# Patient Record
Sex: FEMALE | Race: WHITE
[De-identification: names, ages, dates, MRNs, and addresses within clinical notes are randomized per-mention and may not be internally consistent; named-entity substitution may affect disease eponyms.]

---

## 2018-01-25 ENCOUNTER — HOSPITAL ENCOUNTER (OUTPATIENT)
Dept: HOSPITAL 80 - BHFA | Age: 83
End: 2018-01-25
Attending: INTERNAL MEDICINE
Payer: COMMERCIAL

## 2018-01-25 DIAGNOSIS — E78.5: ICD-10-CM

## 2018-01-25 DIAGNOSIS — I27.20: Primary | ICD-10-CM

## 2018-02-02 ENCOUNTER — HOSPITAL ENCOUNTER (OUTPATIENT)
Dept: HOSPITAL 80 - BHFA | Age: 83
End: 2018-02-02
Attending: INTERNAL MEDICINE
Payer: COMMERCIAL

## 2018-02-02 DIAGNOSIS — E78.5: ICD-10-CM

## 2018-02-02 DIAGNOSIS — I27.21: Primary | ICD-10-CM

## 2018-10-19 ENCOUNTER — HOSPITAL ENCOUNTER (OUTPATIENT)
Dept: HOSPITAL 80 - FSGY | Age: 83
Discharge: HOME | End: 2018-10-19
Attending: INTERNAL MEDICINE
Payer: COMMERCIAL

## 2018-10-19 VITALS — DIASTOLIC BLOOD PRESSURE: 90 MMHG | SYSTOLIC BLOOD PRESSURE: 149 MMHG

## 2018-10-19 DIAGNOSIS — Z86.010: ICD-10-CM

## 2018-10-19 DIAGNOSIS — Z96.653: ICD-10-CM

## 2018-10-19 DIAGNOSIS — E03.9: ICD-10-CM

## 2018-10-19 DIAGNOSIS — Z98.0: ICD-10-CM

## 2018-10-19 DIAGNOSIS — Z90.13: ICD-10-CM

## 2018-10-19 DIAGNOSIS — Z85.3: ICD-10-CM

## 2018-10-19 DIAGNOSIS — Z12.11: Primary | ICD-10-CM

## 2018-10-19 DIAGNOSIS — E11.9: ICD-10-CM

## 2018-10-19 DIAGNOSIS — D12.5: ICD-10-CM

## 2018-10-19 DIAGNOSIS — K21.9: ICD-10-CM

## 2018-10-19 DIAGNOSIS — K29.51: ICD-10-CM

## 2018-10-19 DIAGNOSIS — Z80.0: ICD-10-CM

## 2018-10-19 DIAGNOSIS — D12.3: ICD-10-CM

## 2018-10-19 DIAGNOSIS — K62.1: ICD-10-CM

## 2018-10-19 DIAGNOSIS — E78.5: ICD-10-CM

## 2018-10-19 NOTE — GIREPORT
[facility]

Surgical Services - Endoscopy Department

_____________________________________________________________________________________________________
_______

Patient Name: Sandra Comer                           Procedure Date: 10/19/2018 11:56 AM

MRN: [id number]                                       Account Number: [id number]

Patient Type: Outpatient                             Attending  MD/ ER Physician: Jennifer Reed MD

_____________________________________________________________________________________________________
_______

 

Procedure:                    Upper GI endoscopy

Indications:                  Epigastric abdominal pain, Heartburn

Providers:                    Jennifer eRed MD

Medicines:                    Monitored Anesthesia Care

Complications:                No immediate complications.

Description of Procedure:     After obtaining informed consent, the endoscope was passed under direct
 

                              vision. Throughout the procedure, the patient's blood pressure, pulse, 
and 

                              oxygen saturations were monitored continuously. The Endoscope was intro
duced 

                              through the mouth, and advanced to the second part of duodenum. The [facility]
er GI 

                              endoscopy was accomplished without difficulty. The patient tolerated th
e 

                              procedure well.

Findings:                     The esophagus was normal.

                              Localized moderately erythematous mucosa without bleeding was found in 
the 

                              gastric antrum. Biopsies were taken with a cold forceps for histology. 


                              Estimated blood loss was minimal.

                              The examined duodenum was normal. Biopsies for histology were taken wit
h a 

                              cold forceps for evaluation of celiac disease. Estimated blood loss was
 

                              minimal.

Estimated Blood Loss:         Estimated blood loss was minimal.

Post Op Diagnosis:            - Normal esophagus.

                              - Erythematous mucosa in the antrum. Biopsied.

                              - Normal examined duodenum. Biopsied.

Recommendation:               - Await pathology results.

                              - Patient has a contact number available for emergencies. The signs and
 

                              symptoms of potential delayed complications were discussed with the pat
ient. 

                              Return to normal activities tomorrow. Written discharge instructions we
re 

                              provided to the patient.

                              - Resume previous diet.

                              - Continue present medications.

                              - Use Prilosec OTC 20 mg PO daily for 12 weeks.

                              - Return to GI office in 3 months.

                              - Discharge patient to home.

                              - Thank you for allowing me to participate in the care of your patient.


Attending Participation:

     I personally performed the entire procedure.

 

Jennifer Reed MD

____________________

Jennifer Reed MD

10/19/2018 12:29:27 PM

This report has been signed electronicallyJennifer Reed MD

Number of Addenda: 0

 

Note Initiated On: 10/19/2018 11:56 AM

[email or web address]

## 2018-10-19 NOTE — PDGENHP
History & Physical


Chief Complaint: GERD h/o polyps


History of Present Illness: Heartburn  no dysphagia, h/o colon polyps.  Colon 

perf with colon other GI


Relevant Physical Exam: CV rrr s1s2 nl.  Chest CTA.  Abd + BS soft


Cardiorespiratory Assessment: ASA 11

## 2018-10-19 NOTE — GIREPORT
[facility]

Surgical Services - Endoscopy Department

_____________________________________________________________________________________________________
_______

Patient Name: Sandra Comer                           Procedure Date: 10/19/2018 11:57 AM

MRN: [id number]                                       Account Number: [id number]

Patient Type: Outpatient                             Attending  MD/ ER Physician: Jennifer Reed MD

_____________________________________________________________________________________________________
_______

 

Procedure:                    Colonoscopy

Indications:                  High risk colon cancer surveillance: Personal history of colonic polyps


Providers:                    Jennifer Reed MD

Medicines:                    Monitored Anesthesia Care

Complications:                No immediate complications.

Description of Procedure:     After obtaining informed consent, the scope was passed under direct vis
ion. 

                              Throughout the procedure, the patient's blood pressure, pulse, and oxyg
en 

                              saturations were monitored continuously. The Colonoscope was introduced
 

                              through the anus and advanced to the cecum, identified by appendiceal 

                              orifice and ileocecal valve. The colonoscopy was performed without 

                              difficulty. The patient tolerated the procedure well. The quality of 
e 

                              bowel preparation was good except in the cecum. The terminal ileum, 

                              ileocecal valve, appendiceal orifice, and rectum were photographed.

Findings:                     The perianal and digital rectal examinations were normal.

                              There was evidence of a prior end-to-side colo-colonic anastomosis in t
he 

                              sigmoid colon. This was patent and was characterized by healthy appeari
ng 

                              mucosa.

                              Diverticula were found in the sigmoid colon.

                              A 7 mm polyp was found in the hepatic flexure. The polyp was sessile. T
he 

                              polyp was removed with a cold snare. Resection and retrieval were compl
ete. 

                              Estimated blood loss was minimal.

                              A 6 mm polyp was found in the sigmoid colon. The polyp was sessile. The
 

                              polyp was removed with a cold snare. Resection and retrieval were compl
ete. 

                              Estimated blood loss was minimal.

                              A 4 mm polyp was found in the rectum. The polyp was sessile. The polyp 
was 

                              removed with a cold biopsy forceps. Resection and retrieval were comple
te. 

                              Estimated blood loss was minimal.

Estimated Blood Loss:         Estimated blood loss was minimal.

Post Op Diagnosis:            - Patent end-to-side colo-colonic anastomosis, characterized by healthy
 

                              appearing mucosa.

                              - Diverticulosis in the sigmoid colon.

                              - One 7 mm polyp at the hepatic flexure, removed with a cold snare. Res
ected 

                              and retrieved.

                              - One 6 mm polyp in the sigmoid colon, removed with a cold snare. Resec
diana 

                              and retrieved.

                              - One 4 mm polyp in the rectum, removed with a cold biopsy forceps. Res
ected 

                              and retrieved.

Recommendation:               - Await pathology results.

                              - Patient has a contact number available for emergencies. The signs and
 

                              symptoms of potential delayed complications were discussed with the pat
ient. 

                              Return to normal activities tomorrow. Written discharge instructions we
re 

                              provided to the patient.

                              - High fiber diet.

                              - Continue present medications.

                              - Consider repeat colonoscopy is recommended for surveillance. The 

                              colonoscopy date will be determined after pathology results from today'
s 

                              exam become available for review.

                              - If all 3 polyps are adenomas consider repeat colon in 3 years if zuri
ent 

                              desires to continue surveillance.

                              - Discharge patient to home.

                              - Thank you for allowing me to participate in the care of your patient.


Attending Participation:

     I personally performed the entire procedure.

 

Jennifer Reed MD

____________________

Jennifer Reed MD

10/19/2018 12:34:21 PM

This report has been signed electronicallyJennifer Reed MD

Number of Addenda: 0

 

Note Initiated On: 10/19/2018 11:57 AM

Total Procedure Duration Time 0 hours 12 minutes 49 seconds 

[email or web address]

## 2018-10-19 NOTE — PDANEPAE
ANE History of Present Illness





Colonoscopy, EGD





ANE Past Medical History





- Cardiovascular History


Hx Hypertension: No


Hx Arrhythmias: No


Hx Chest Pain: No


Hx Coronary Artery / Peripheral Vascular Disease: No


Hx CHF / Valvular Disease: No


Hx Palpitations: No


Cardiovascular History Comment: HYPERLIPIDEMIA





- Pulmonary History


Hx COPD: No


Hx Asthma/Reactive Airway Disease: No


Hx Recent Upper Respiratory Infection: No


Hx Oxygen in Use at Home: No


Hx Sleep Apnea: No


Sleep Apnea Screening Result - Last Documented: Negative





- Neurologic History


Hx Cerebrovascular Accident: No


Hx Seizures: No


Hx Dementia: No


Neurologic History Comment: HA





- Endocrine History


Hx Diabetes: Yes


Endocrine History Comment: NIDDM.  HYPOTHYROID





- Renal History


Hx Renal Disorders: No





- Liver History


Hx Hepatic Disorders: No





- Neurological & Psychiatric Hx


Hx Neurological and Psychiatric Disorders: Yes


Neurological / Psychiatric History Comment: ANXIETY, DEPRESSION





- Cancer History


Hx Cancer: Yes


Cancer History Comment: Breast CA.  L LeIomyoma Carcoma Thigh.  basal cell 

carcinoma- NOSE





- Congenital Disorder History


Hx Congenital Disorders: No





- GI History


Hx Gastrointestinal Disorders: Yes


Gastrointestinal History Comment: GERD.  HIATAL HERNIA.  PRECANCEROUS POLYPS 

COLON.  bowel perf following colonocscopy 1999 req bowel resection





- Other Health History


Other Health History: OSTEOARTHRITIS





- Chronic Pain History


Chronic Pain: Yes (OSTEOARTHRITIS- SPINE, KNEES)





- Surgical History


Prior Surgeries: UMBILICAL HERNIA/REEXCISION BREAST MARGIN 9/30/16.  NOEMI TOTAL 

KNEE'S.  Bilat Mastectomy 2009.  Nose reconstruction 2008,2009.  Bowel 

resection 1999.  L Thigh lumpectomy 1996.  L Breast Lumpectomy/Rad 1991





ANE Review of Systems


Review of Systems: 








- Exercise capacity


METS (RN): 4 METS





ANE Patient History





- Allergies


Allergies/Adverse Reactions: 








No Allergies [NKDA] Allergy (Verified 11/09/15 10:19)


 








- Home Medications


Home Medications: 








Calcium Carbonate [Tums 500MG (*)] 500 mg PO PRN PRN 10/30/12 [Last Taken 3 

Weeks Ago ~09/28/18]


Levothyroxine [Synthroid 50 mcg (*)] 50 mcg PO DAILY06 11/02/15 [Last Taken 10/

18/18]


Multivitamins [Multivitamin (*)] 1 each PO DAILY 11/02/15 [Last Taken 3 Days 

Ago ~10/16/18]


Simvastatin [Zocor] 20 mg PO HS 11/02/15 [Last Taken 10/17/18]


metFORMIN HCL [Glucophage 500 mg (*)] 500 mg PO BIDMEAL 11/02/15 [Last Taken 2 

Days Ago ~10/17/18]


Pepcid HS 09/11/18 [Last Taken 3 Days Ago ~10/16/18]


Zoloft 50mg (*) HS 09/11/18 [Last Taken 10/18/18]








- NPO status


NPO Since - Liquids (Date): 10/19/18


NPO Since - Liquids (Time): 02:00


NPO Since - Solids (Date): 10/18/18


NPO Since - Solids (Time): 09:00





- Smoking Hx


Smoking Status: Never smoked





- Family Anes Hx


Family Hx Anesthesia Complications: None





ANE Labs/Vital Signs





- Vital Signs


Blood Pressure: 124/76


Heart Rate: 67


Respiratory Rate: 14


O2 Sat (%): 96


Height: 157.48 cm


Weight: 72.575 kg





ANE Physical Exam





- Airway


Neck exam: FROM


Mallampati Score: Class 2


Mouth exam: normal dental/mouth exam





- Pulmonary


Pulmonary: clear to auscultation





- Cardiovascular


Cardiovascular: regular rate and rhythym





- ASA Status


ASA Status: II





ANE Anesthesia Plan


Anesthesia Plan: GA with mask